# Patient Record
Sex: FEMALE | Race: WHITE
[De-identification: names, ages, dates, MRNs, and addresses within clinical notes are randomized per-mention and may not be internally consistent; named-entity substitution may affect disease eponyms.]

---

## 2019-12-27 ENCOUNTER — HOSPITAL ENCOUNTER (INPATIENT)
Dept: HOSPITAL 26 - MED | Age: 39
LOS: 2 days | Discharge: HOME | DRG: 720 | End: 2019-12-29
Attending: INTERNAL MEDICINE | Admitting: INTERNAL MEDICINE
Payer: COMMERCIAL

## 2019-12-27 VITALS — SYSTOLIC BLOOD PRESSURE: 140 MMHG | DIASTOLIC BLOOD PRESSURE: 76 MMHG

## 2019-12-27 VITALS — WEIGHT: 110 LBS | HEIGHT: 63 IN | BODY MASS INDEX: 19.49 KG/M2

## 2019-12-27 VITALS — SYSTOLIC BLOOD PRESSURE: 122 MMHG | DIASTOLIC BLOOD PRESSURE: 80 MMHG

## 2019-12-27 DIAGNOSIS — A41.9: Primary | ICD-10-CM

## 2019-12-27 DIAGNOSIS — N61.1: ICD-10-CM

## 2019-12-27 LAB
ALBUMIN FLD-MCNC: 4 G/DL (ref 3.4–5)
ANION GAP SERPL CALCULATED.3IONS-SCNC: 13 MMOL/L (ref 8–16)
APPEARANCE UR: CLEAR
AST SERPL-CCNC: 7 U/L (ref 15–37)
BASOPHILS # BLD AUTO: 0.1 K/UL (ref 0–0.22)
BASOPHILS NFR BLD AUTO: 0.6 % (ref 0–2)
BILIRUB SERPL-MCNC: 0.8 MG/DL (ref 0–1)
BILIRUB UR QL STRIP: NEGATIVE
BUN SERPL-MCNC: 8 MG/DL (ref 7–18)
CHLORIDE SERPL-SCNC: 102 MMOL/L (ref 98–107)
CO2 SERPL-SCNC: 26.7 MMOL/L (ref 21–32)
COLOR UR: YELLOW
CREAT SERPL-MCNC: 0.9 MG/DL (ref 0.6–1.3)
EOSINOPHIL # BLD AUTO: 0.1 K/UL (ref 0–0.4)
EOSINOPHIL NFR BLD AUTO: 1 % (ref 0–4)
ERYTHROCYTE [DISTWIDTH] IN BLOOD BY AUTOMATED COUNT: 12.5 % (ref 11.6–13.7)
GFR SERPL CREATININE-BSD FRML MDRD: 89 ML/MIN (ref 90–?)
GLUCOSE SERPL-MCNC: 91 MG/DL (ref 74–106)
GLUCOSE UR STRIP-MCNC: NEGATIVE MG/DL
HCT VFR BLD AUTO: 44.3 % (ref 36–48)
HGB BLD-MCNC: 14.9 G/DL (ref 12–16)
HGB UR QL STRIP: NEGATIVE
LEUKOCYTE ESTERASE UR QL STRIP: NEGATIVE
LYMPHOCYTES # BLD AUTO: 1.4 K/UL (ref 2.5–16.5)
LYMPHOCYTES NFR BLD AUTO: 12.3 % (ref 20.5–51.1)
MCH RBC QN AUTO: 31 PG (ref 27–31)
MCHC RBC AUTO-ENTMCNC: 34 G/DL (ref 33–37)
MCV RBC AUTO: 92.4 FL (ref 80–94)
MONOCYTES # BLD AUTO: 1.8 K/UL (ref 0.8–1)
MONOCYTES NFR BLD AUTO: 16.3 % (ref 1.7–9.3)
NEUTROPHILS # BLD AUTO: 7.8 K/UL (ref 1.8–7.7)
NEUTROPHILS NFR BLD AUTO: 69.8 % (ref 42.2–75.2)
NITRITE UR QL STRIP: NEGATIVE
PH UR STRIP: 6 [PH] (ref 5–9)
PLATELET # BLD AUTO: 308 K/UL (ref 140–450)
POTASSIUM SERPL-SCNC: 3.7 MMOL/L (ref 3.5–5.1)
PROTHROMBIN TIME: 10.3 SECS (ref 10.8–13.4)
RBC # BLD AUTO: 4.8 MIL/UL (ref 4.2–5.4)
SODIUM SERPL-SCNC: 138 MMOL/L (ref 136–145)
WBC # BLD AUTO: 11.1 K/UL (ref 4.8–10.8)

## 2019-12-27 PROCEDURE — 0H9T0ZZ DRAINAGE OF RIGHT BREAST, OPEN APPROACH: ICD-10-PCS | Performed by: SURGERY

## 2019-12-27 RX ADMIN — LIDOCAINE HYDROCHLORIDE ONE MG: 10 INJECTION, SOLUTION INFILTRATION; PERINEURAL at 18:50

## 2019-12-27 RX ADMIN — HYDROCODONE BITARTRATE AND ACETAMINOPHEN PRN TAB: 5; 325 TABLET ORAL at 21:42

## 2019-12-27 RX ADMIN — DEXTROSE SCH MLS/HR: 50 INJECTION, SOLUTION INTRAVENOUS at 21:51

## 2019-12-27 RX ADMIN — LIDOCAINE HYDROCHLORIDE ONE MG: 10 INJECTION, SOLUTION INFILTRATION; PERINEURAL at 16:28

## 2019-12-27 RX ADMIN — BUPIVACAINE HYDROCHLORIDE AND EPINEPHRINE BITARTRATE ONE ML: 2.5; .005 INJECTION, SOLUTION EPIDURAL; INFILTRATION; INTRACAUDAL; PERINEURAL at 16:28

## 2019-12-27 RX ADMIN — BUPIVACAINE HYDROCHLORIDE AND EPINEPHRINE BITARTRATE ONE ML: 2.5; .005 INJECTION, SOLUTION EPIDURAL; INFILTRATION; INTRACAUDAL; PERINEURAL at 18:50

## 2019-12-27 NOTE — NUR
RECIEVED REPORT FROM MORNING SHIFT - PT STILL  IN THE OR - POST I& D OF RIGHT BREAST 
ABCESS.- WAITING THE PT.

## 2019-12-27 NOTE — NUR
Patient will be admitted to care of DR. FUNES. Admited to MED SURG.  Will go to 
[id number]. Belongings list completed.  Report to MADDY VARMA.

## 2019-12-27 NOTE — NUR
38 Y/O F C/O RIGHT BREAST CYST THAT X 1 WEEK THAT IS PAINFUL 6/10. PT STATES 
SHE HAS BEEN TREATED BY HER MD FOR THIS AND WAS WAITING FOR AN ULTRASOUND. 
THERE IS A BROWN, INDENTED AREA THE ON THE RIGHT SIDE OF THE NIPPLE, RIGHT 
BREAST. THE MD ADVISED HER TO GO INTO THE ED FOR AN ULTRASOUND ASAP. DENIES 
N/V/F. PT PUT INTO GOWN FOR EXAM, BED LOWERED, SIDE RAIL X1 IN PLACE.

## 2019-12-27 NOTE — NUR
PT STATES SHE IS ITCHING AFTER IV OF VANCOMYCIN. MD ORDERED 25 MG OF BENADRYAL 
IVP AND CONTINUE VANCOMYCIN AT A SLOW RATE OF 75ML HR. PT GIVEN BENEDRYL, 
STATED FEELING LESS ITCHING, REDNESS IN FACE IS GONE. PT RESTARTED ON 
VANCOMYCIN.

## 2019-12-27 NOTE — NUR
ON REG. DIET AS ORDERED . ATE SANDWICH - TOLERATED. C/O PAIN ON INSISSION SITE - WILL 
MEDICATE FOR PAIN AS ORDERED. D/C IVF AS ORDERED. WILL CONT. TO MONITOR - /60 , 02 SAT 
- WNL .

## 2019-12-27 NOTE — NUR
PATIENT IS OFF THE UNIT GOING TO OR FOR I&D. CONSENT HAS BEEN OBTAINED. PATIENTS MOTHER AT 
BEDSIDE.

## 2019-12-27 NOTE — NUR
RECIEVED PT FROM RECOVERY / DAGO , AAOX4 , FULLY AWAKE . NO S/SXS OF ACUTE DISTRESS NOTED 
AT THIS TIME. WITH IV SITE INTACT AND PATENT.S/P I& D OF THE RIGHT BREAST ABCESS - WITH 
DRESSING INTACT AND PATENT - ACCORDING TO OR NOD THERE IS PACKING UNDERNEATH OF THE DRESSING 
- DRY AND INTACT - NO ACTIVE BLEEDING NOTED AT THIS TIME. MOTHER AT BEDSIDE . PLAN OF CARE 
DISCUSSED AND VERBALIZED UNDERSTANDING - CALL LIGHT WITHIN REACH. HOOK ON V/S MONITORING 
MACHINE - V/S WNL O2 SAT - WNL - ENCOURAGE DEEP BREATHING EXERCISES - WILL GIVE RETURN DEMO 
ABOUT INCENTIVE SPIROMETRY - WILL CONT. TO MONITOR.

## 2019-12-27 NOTE — NUR
RECEIVED PATIENT FROM ER NURSE. PATIENT IS AMBULATORY, AAOX4, FULL CODE, NKA. PATIENT IS TO 
HAVE I&D AT 1700. IV TO L AC 20G. VANCOMYCIN CURRENTLY INFUSING. PATIENT HAD BENADRYL FOR 
REACTION TO VANCOMYCIN, REACTION S/S ARE GONE POST BENADRYL 25MG PER ER NURSE. WILL REVIEW 
AND CONTINUE WITH PLAN OF CARE.

## 2019-12-28 VITALS — DIASTOLIC BLOOD PRESSURE: 60 MMHG | SYSTOLIC BLOOD PRESSURE: 110 MMHG

## 2019-12-28 VITALS — SYSTOLIC BLOOD PRESSURE: 108 MMHG | DIASTOLIC BLOOD PRESSURE: 68 MMHG

## 2019-12-28 VITALS — SYSTOLIC BLOOD PRESSURE: 103 MMHG | DIASTOLIC BLOOD PRESSURE: 66 MMHG

## 2019-12-28 VITALS — DIASTOLIC BLOOD PRESSURE: 60 MMHG | SYSTOLIC BLOOD PRESSURE: 101 MMHG

## 2019-12-28 VITALS — SYSTOLIC BLOOD PRESSURE: 102 MMHG | DIASTOLIC BLOOD PRESSURE: 58 MMHG

## 2019-12-28 LAB
ANION GAP SERPL CALCULATED.3IONS-SCNC: 13.5 MMOL/L (ref 8–16)
BASOPHILS # BLD AUTO: 0 K/UL (ref 0–0.22)
BASOPHILS NFR BLD AUTO: 0.1 % (ref 0–2)
BUN SERPL-MCNC: 10 MG/DL (ref 7–18)
CHLORIDE SERPL-SCNC: 105 MMOL/L (ref 98–107)
CO2 SERPL-SCNC: 23.6 MMOL/L (ref 21–32)
CREAT SERPL-MCNC: 0.7 MG/DL (ref 0.6–1.3)
EOSINOPHIL # BLD AUTO: 0 K/UL (ref 0–0.4)
EOSINOPHIL NFR BLD AUTO: 0.1 % (ref 0–4)
ERYTHROCYTE [DISTWIDTH] IN BLOOD BY AUTOMATED COUNT: 12.4 % (ref 11.6–13.7)
GFR SERPL CREATININE-BSD FRML MDRD: 119 ML/MIN (ref 90–?)
GLUCOSE SERPL-MCNC: 117 MG/DL (ref 74–106)
HCT VFR BLD AUTO: 40.9 % (ref 36–48)
HGB BLD-MCNC: 13.9 G/DL (ref 12–16)
LYMPHOCYTES # BLD AUTO: 0.8 K/UL (ref 2.5–16.5)
LYMPHOCYTES NFR BLD AUTO: 5.7 % (ref 20.5–51.1)
MAGNESIUM SERPL-MCNC: 2.1 MG/DL (ref 1.8–2.4)
MCH RBC QN AUTO: 32 PG (ref 27–31)
MCHC RBC AUTO-ENTMCNC: 34 G/DL (ref 33–37)
MCV RBC AUTO: 92.3 FL (ref 80–94)
MONOCYTES # BLD AUTO: 1.7 K/UL (ref 0.8–1)
MONOCYTES NFR BLD AUTO: 12.1 % (ref 1.7–9.3)
NEUTROPHILS # BLD AUTO: 11.4 K/UL (ref 1.8–7.7)
NEUTROPHILS NFR BLD AUTO: 82 % (ref 42.2–75.2)
PHOSPHATE SERPL-MCNC: 3.3 MG/DL (ref 2.5–4.9)
PLATELET # BLD AUTO: 287 K/UL (ref 140–450)
POTASSIUM SERPL-SCNC: 4.1 MMOL/L (ref 3.5–5.1)
RBC # BLD AUTO: 4.44 MIL/UL (ref 4.2–5.4)
SODIUM SERPL-SCNC: 138 MMOL/L (ref 136–145)
WBC # BLD AUTO: 13.9 K/UL (ref 4.8–10.8)

## 2019-12-28 RX ADMIN — DEXTROSE SCH MLS/HR: 50 INJECTION, SOLUTION INTRAVENOUS at 12:57

## 2019-12-28 RX ADMIN — DEXTROSE SCH MLS/HR: 50 INJECTION, SOLUTION INTRAVENOUS at 22:28

## 2019-12-28 RX ADMIN — DEXTROSE SCH MLS/HR: 50 INJECTION, SOLUTION INTRAVENOUS at 05:28

## 2019-12-28 RX ADMIN — HYDROCODONE BITARTRATE AND ACETAMINOPHEN PRN TAB: 5; 325 TABLET ORAL at 14:16

## 2019-12-28 RX ADMIN — HYDROCODONE BITARTRATE AND ACETAMINOPHEN PRN TAB: 5; 325 TABLET ORAL at 09:24

## 2019-12-28 NOTE — NUR
HOURLY ROUNDING. PT ASLEEP IN BED. RESPONSIVE TO VERBAL AND TACTILE STIMULI. ABLE TO MAKE 
NEEDS KNOWN. RESPIRATIONS EVEN AND UNLABORED WITH NO SOB OR RESPIRATORY DISTRESS. SKIN WARM 
AND DRY TO TOUCH. SAFETY MEASURES IN PLACE. WILL CONTINUE TO MONITOR.

## 2019-12-28 NOTE — NUR
MADE ROUNDS , NO S/SXS OF ACUTE DISTRESS NOTED AT THIS TIME , BEARABLE PAIN AS SHE SAID., 
VOIDED FREELY.

## 2019-12-28 NOTE — NUR
PATIENT CALLED AND COMPLAINED OF PAIN. PRN PAIN MEDICATION ADMINISTERED PER MD ORDER. 
MEDICATION EDUCATION PERFORMED. PT VERBALIZED UNDERSTANDING AND CAN TEACH BACK. SAFETY 
MEASURES IN PLACE. WILL CONTINUE TO MONITOR.

## 2019-12-28 NOTE — NUR
RECIEVED PT AAOX4 , NEWLY CHANGED DRESSING OF SURGICAL DRESSING ON RIGHT CHEST ( POST I& D) 
, SURGICAL DRESSING STILL DRY AND INTACT - NO ACTIVE BLEEDING NOTED AT THIS TIME , DENIES 
ANY PAIN AT THIS TIME , ON SALINE LOCK - INTACT AND PATENT. POC DISCUSSED AND VERBALIZED 
UNDERSTANDING - CALL LIGHT WITHIN REACH. WILL CONT. TO MONITOR.

-------------------------------------------------------------------------------

Addendum: 12/29/19 at 0402 by Anabel Garcia RN

-------------------------------------------------------------------------------

NO S/SXS OF ACUTE DISTRESS NOTED AT THIS TIME , ON SAFETY PRECAUTION PROTOCOL - REMINDS THE 
USE OF CALL LIGHT .

## 2019-12-28 NOTE — NUR
PT CALLED AND COMPLAINED OF PAIN. PRN PAIN MEDICATION ADMINISTERED AS PRESCRIBED PER MD 
ORDER. PT TOLERATED WELL. MEDICATION EDUCATION PERFORMED. PT VERBALIZED UNDERSTANDING AND 
CAN TEACH BACK. SAFETY MEASURES IN PLACE

## 2019-12-28 NOTE — NUR
ENDORSED TO NIGHTSHIFT NURSE AT BEDSIDE. PT RESTING IN BED WITH FAMILY AT BEDSIDE. ABLE TO 
MAKE NEEDS KNOWN. RESPIRATIONS EVEN AND UNLABORED WITH NO SOB OR RESPIRATORY DISTRESS. SKIN 
WARM AND DRY TO TOUCH. SAFETY MEASURES IN PLACE. PT IS STABLE

## 2019-12-28 NOTE — NUR
PT RESTING IN BED WITH FAMILY AT BEDSIDE. ABLE TO MAKE NEEDS KNOWN. RESPIRATIONS EVEN AND 
UNLABORED WITH NO SOB OR RESPIRATORY DISTRESS. SKIN WARM AND DRY TO TOUCH. SAFETY MEASURES 
IN PLACE. WILL CONTINUE TO MONITOR.

## 2019-12-28 NOTE — NUR
RECEIVED BEDSIDE REPORT FROM NIGHTSHIFT NURSE. PT ASLEEP IN BED UPON ARRIVAL. RESPONSIVE TO 
VERBAL AND TACTILE STIMULI. ABLE TO MAKE NEEDS KNOWN. RESPIRATIONS EVEN AND UNLABORED WITH 
NO SOB OR RESPIRATORY DISTRESS. SKIN WARM AND DRY TO TOUCH. IV SITE IN LEFT AC 20G CLEAN, 
DRY, AND INTACT. SAFETY MEASURES IN PLACE. WILL CONTINUE TO MONITOR.

## 2019-12-28 NOTE — NUR
ADMINISTERED MEDICATION AS PRESCRIBED PER MD ORDER. PT TOLERATED WELL. MEDICATION EDUCATION 
PERFORMED. PT VERBALIZED UNDERSTANDING. SAFETY MEASURES IN PLACE.

## 2019-12-28 NOTE — NUR
[facility] CONSULT RECEIVED ON 12/28/19 FOR RIGHT BREAST ABSCESS. CONSULT REASON DOES NOT MEET HIGH 
RISK CRITERIA PER HOSPITAL POLICY. PT WILL BE SEEN AND ASSESSED ACCORDING TO HOSPITAL 
POLICY. 



12/28/19



SILVIA FU RD

## 2019-12-28 NOTE — NUR
HOURLY ROUNDING. PT RESTING IN BED UPON ARRIVAL. FAMILY AT BEDSIDE. RESPONSIVE TO VERBAL AND 
TACTILE STIMULI. ABLE TO MAKE NEEDS KNOWN. RESPIRATIONS EVEN AND UNLABORED WITH NO SOB OR 
RESPIRATORY DISTRESS. SKIN WARM AND DRY TO TOUCH. IV SITE IN LEFT AC 20G CLEAN, DRY, AND 
INTACT. SAFETY MEASURES IN PLACE. WILL CONTINUE TO MONITOR.

## 2019-12-28 NOTE — NUR
PT HAS BEEN SCREENED AND CATEGORIZED AS LOW NUTRITION RISK. PT WILL BE SEEN WITHIN 5-7 DAYS 
OF ADMISSION. 



1/1/20-1/3/20



SILVIA FU [address]

## 2019-12-29 VITALS — SYSTOLIC BLOOD PRESSURE: 115 MMHG | DIASTOLIC BLOOD PRESSURE: 73 MMHG

## 2019-12-29 VITALS — DIASTOLIC BLOOD PRESSURE: 60 MMHG | SYSTOLIC BLOOD PRESSURE: 105 MMHG

## 2019-12-29 VITALS — DIASTOLIC BLOOD PRESSURE: 73 MMHG | SYSTOLIC BLOOD PRESSURE: 115 MMHG

## 2019-12-29 LAB
ANION GAP SERPL CALCULATED.3IONS-SCNC: 10.3 MMOL/L (ref 8–16)
BASOPHILS # BLD AUTO: 0 K/UL (ref 0–0.22)
BASOPHILS NFR BLD AUTO: 0.3 % (ref 0–2)
BUN SERPL-MCNC: 9 MG/DL (ref 7–18)
CHLORIDE SERPL-SCNC: 105 MMOL/L (ref 98–107)
CO2 SERPL-SCNC: 24.6 MMOL/L (ref 21–32)
CREAT SERPL-MCNC: 0.6 MG/DL (ref 0.6–1.3)
EOSINOPHIL # BLD AUTO: 0.1 K/UL (ref 0–0.4)
EOSINOPHIL NFR BLD AUTO: 0.8 % (ref 0–4)
ERYTHROCYTE [DISTWIDTH] IN BLOOD BY AUTOMATED COUNT: 12.4 % (ref 11.6–13.7)
GFR SERPL CREATININE-BSD FRML MDRD: 142 ML/MIN (ref 90–?)
GLUCOSE SERPL-MCNC: 109 MG/DL (ref 74–106)
HCT VFR BLD AUTO: 39.7 % (ref 36–48)
HGB BLD-MCNC: 13.4 G/DL (ref 12–16)
LYMPHOCYTES # BLD AUTO: 0.8 K/UL (ref 2.5–16.5)
LYMPHOCYTES NFR BLD AUTO: 9.5 % (ref 20.5–51.1)
MAGNESIUM SERPL-MCNC: 2 MG/DL (ref 1.8–2.4)
MCH RBC QN AUTO: 31 PG (ref 27–31)
MCHC RBC AUTO-ENTMCNC: 34 G/DL (ref 33–37)
MCV RBC AUTO: 93.2 FL (ref 80–94)
MONOCYTES # BLD AUTO: 1.3 K/UL (ref 0.8–1)
MONOCYTES NFR BLD AUTO: 15.7 % (ref 1.7–9.3)
NEUTROPHILS # BLD AUTO: 6.3 K/UL (ref 1.8–7.7)
NEUTROPHILS NFR BLD AUTO: 73.7 % (ref 42.2–75.2)
PHOSPHATE SERPL-MCNC: 3.5 MG/DL (ref 2.5–4.9)
PLATELET # BLD AUTO: 225 K/UL (ref 140–450)
POTASSIUM SERPL-SCNC: 3.9 MMOL/L (ref 3.5–5.1)
RBC # BLD AUTO: 4.26 MIL/UL (ref 4.2–5.4)
SODIUM SERPL-SCNC: 136 MMOL/L (ref 136–145)
WBC # BLD AUTO: 8.5 K/UL (ref 4.8–10.8)

## 2019-12-29 RX ADMIN — DEXTROSE SCH MLS/HR: 50 INJECTION, SOLUTION INTRAVENOUS at 04:06

## 2019-12-29 NOTE — NUR
PATIENT WAS DISCHARGED FROM [facility] IN STABLE CONDITION, TO FOLLOW UP 
WITH PCP FOR REPACKING OF SURGICAL SITE. ALL BELONGINGS ACCOUNTED FOR, PAPERWORK GIVEN AND 
REVIEWED.

## 2019-12-29 NOTE — NUR
PATIENT IS RESTING IN BED, REPORTS PAIN FROM INCISION SITE, DRESSING TO BE REMOVED. FOLLOW 
UP CARE PER DR. SOTO. WILL CONTINUE TO MONITOR.

## 2019-12-29 NOTE — NUR
PATIENTS PACKING WAS REMOVED FROM RIGHT BREAST, TOLERATED WELL WITH PAIN MEDICATIONS. 
PENDING DISCHARGE. WILL CONTINUE TO MONITOR.

## 2019-12-29 NOTE — NUR
RECEIVED REPORT FROM NIGHTSHIFT NURSE FOR CONTINUITY OF CARE. PATIENT IS STABLE, DISCHARGE 
PENDING.